# Patient Record
Sex: MALE | Race: OTHER
[De-identification: names, ages, dates, MRNs, and addresses within clinical notes are randomized per-mention and may not be internally consistent; named-entity substitution may affect disease eponyms.]

---

## 2018-05-21 ENCOUNTER — HOSPITAL ENCOUNTER (EMERGENCY)
Dept: HOSPITAL 80 - FED | Age: 33
Discharge: HOME | End: 2018-05-21
Payer: COMMERCIAL

## 2018-05-21 VITALS — DIASTOLIC BLOOD PRESSURE: 87 MMHG | SYSTOLIC BLOOD PRESSURE: 132 MMHG

## 2018-05-21 DIAGNOSIS — S51.812A: Primary | ICD-10-CM
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
## 2018-05-21 DIAGNOSIS — F17.200: ICD-10-CM
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
## 2018-05-21 PROCEDURE — 0HQEXZZ REPAIR LEFT LOWER ARM SKIN, EXTERNAL APPROACH: ICD-10-PCS

## 2018-05-21 PROCEDURE — L3908 WHO COCK-UP NONMOLDE PRE OTS: HCPCS

## 2018-05-21 NOTE — EDPHY
H & P


Stated Complaint: L forearm lac


Time Seen by Provider: 05/21/18 11:45


HPI/ROS: 


HPI:  This is a 32-year-old male who presents with





Chief Complaint:  Left forearm laceration





Location:  Left forearm


Quality:  Laceration


Duration:  Prior to arrival


Signs and Symptoms:  + bleeding, no radiation, no numbness, no weakness, no 

tingling, no incontinence, no decreased range of motion, no swelling, + pain, 

no fever


Timing:  Acute


Severity:  Moderate


Context:  Patient is right-hand dominant presents with shafer with complaints 

of arm laceration.  He is currently working across the street, when he 

accidentally slipped on the scaffolding and fell forward.  He used left arm to 

stop his fall.  He brushed left forearm against the cinder block which caused a 

laceration.  He reports that he was wearing his hard hat and did not hit his 

head. Denies LOC/head injury/neck pain/dizziness/nausea/vomiting/amnesia. 

Reports tetanus up-to-date.  He complains of pain in his left forearm without 

any radiation.  He reports that pain increases with touching the area.  Denies 

decreased range of motion/paresthesias/weakness. 


Modifying Factors:  Direct pressure





Comment: 








ROS: see HPI


Constitutional: No fever, no chills, no weight loss


Eyes:  No blurred vision


Respiratory:  No shortness of breath, no cough


Cardiovascular:  No chest pain


Gastrointestinal:  No nausea, no vomiting no diarrhea 


Genitourinary:  No dysuria 


Extremities:  No myalgias 


Neurologic:  No weakness, no numbness


Skin:  No rashes


Hematologic:  No bruising, no bleeding





MEDICAL/SURGICAL/SOCIAL HISTORY: 


Medical history:  Depression, hyperlipidemia


Surgical history:  Denies


Social history:  Employed. 








CONSTITUTIONAL:  Extremely anxious  male, awake and alert, no obvious 

distress


HEENT: Atraumatic and normocephalic, PERRL, EOMI.  Nares patent; no rhinorrhea;

  no nasal mucosal edema. Tympanic membranes clear. Oropharynx clear, no 

exudate and moist pink mucosa.  Airway patent.  No lymphadenopathy.  No 

meningismus.


Cardiovascular: Normal S1/S2, regular rate, regular rhythm, without murmur rub 

or gallop.


PULMONARY/CHEST:  Symmetrical and nontender. Clear to auscultation bilaterally. 

Good air movement. No accessory muscle usage.


ABDOMEN:  Soft, nondistended, nontender, no rebound, no guarding, no peritoneal 

signs, no masses or organomegaly. No CVAT.


EXTREMITIES:  2/2 pulses, strength 5/5, left posterior mid-forearm shows 6 inch 

deep, v-shaped, laceration; no active bleeding.  Left WRIST:  Extension to 70, 

flexion to 80, radial deviation to 20 degree, ulnar deviation to 30, no 

scaphoid tenderness, no tenderness over ulnar styloid, no tenderness over 

radial styloid.  Full range of motion of flexion extension DIP, PIP, MCP joints 

of all 5 fingers. no clubbing, no cyanosis or edema.


NEUROLOGICAL: no focal neuro deficits.  GCS 15.


SKIN: Warm and dry, no erythema. no rash.  Good capillary refill. 


  





Source: Patient


Exam Limitations: No limitations





- Personal History


Current Tetanus/Diphtheria Vaccine: Yes


Current Tetanus Diphtheria and Acellular Pertussis (TDAP): Yes





- Medical/Surgical History


Hx Asthma: No


Hx Chronic Respiratory Disease: No


Hx Diabetes: No


Hx Cardiac Disease: No


Hx Renal Disease: No


Hx Cirrhosis: No


Hx Alcoholism: No


Hx HIV/AIDS: No


Hx Splenectomy or Spleen Trauma: No


Other PMH: depression, hyperlipidemia,





- Social History


Smoking Status: Current every day smoker


Constitutional: 


 Initial Vital Signs











Temperature (C)  36.8 C   05/21/18 11:31


 


Heart Rate  98   05/21/18 11:31


 


Respiratory Rate  20   05/21/18 11:31


 


Blood Pressure  143/93 H  05/21/18 11:31


 


O2 Sat (%)  92   05/21/18 11:31








 











O2 Delivery Mode               Room Air














Allergies/Adverse Reactions: 


 





No Known Allergies Allergy (Unverified 05/21/18 11:30)


 








Home Medications: 














 Medication  Instructions  Recorded


 


2 Depression Meds  05/21/18


 


Lipitor  05/21/18














Medical Decision Making





- Diagnostics


Imaging Results: 


 Imaging Impressions





Forearm X-Ray  05/21/18 12:00


Impression:


1. No acute osseous abnormality seen left forearm.


2. Laceration adjacent to the distal third of the ulna that appears to extend 

to the bony surface without fracture.











Procedures: 


Procedure:  Laceration repair.





Verbal consent was obtained from the patient.  The 6 inch, deep, complex, v-

shaped laceration on the left mid forearm was anesthetized in the usual fashion 

using 10 mL of 0.5% bupivacaine with epinephrine.  The wound was irrigated, 

draped and explored to its base with a gloved finger.  There were no deep 

structures involved.  No tendon injury was identified.  The wound was repaired 

with continuous running 4-0 Prolene.  The procedure was performed by myself.








Procedure:  Splint placement.





A left Velcro wrist splint was applied by the Emergency Room technician.  After 

application of the splint I returned and re-examined the patient.  The splint 

was adequately immobilizing the joint and distal to the splint the patient's 

circulation and sensation was intact.





ED Course/Re-evaluation: 


Left forearm x-ray my read shows soft tissue injury, no fracture


Tetanus up-to-date. 


Laceration repaired


Given Percocet x1


No signs of neurovascular compromise/tenting of skin/compartment syndrome/

extremities and joints examined above and below area of concern and are 

neurovascularly intact/injury.


Clean sterile dressing and left Velcro wrist splint given


Written and verbal wound care instructions provided. 


Work limited duty note provided. 








This patient was seen under the supervision of my secondary supervising 

physician.  I evaluated care for this patient independently.  





Differential Diagnosis: 


Differential diagnosis includes but is not limited to laceration, nerve injury, 

tendon injury, vascular injury. 








- Data Points


Medications Given: 


 








Discontinued Medications





Oxycodone/Acetaminophen (Percocet 5/325)  1 tab PO EDNOW ONE


   Stop: 05/21/18 12:21


   Last Admin: 05/21/18 13:03 Dose:  1 tab








Departure





- Departure


Disposition: Home, Routine, Self-Care


Clinical Impression: 


Laceration of left forearm without complication


Qualifiers:


 Encounter type: initial encounter Qualified Code(s): S51.812A - Laceration 

without foreign body of left forearm, initial encounter





Condition: Good


Instructions:  Care For Your Stitches (ED), Laceration (ED)


Additional Instructions: 


Keep the dressing dry and in place for 48 hours.


After 48 hours, you may remove the dressing; wash the site daily with mild soap 

and water; then pat dry. 


Take Tylenol 650 mg every 4 hours and/or Ibuprofen 600 mg every 8 hours with 

food as needed for pain. 


Apply ice for 30 minutes at a time; 2-3 times per day for the next 1-2 days.


Wear the splint except to shower until your sutures are removed. 


 


Wound Care Follow-Up:


Removal of sutures in [10-14] days.  Suture removal is complimentary in 

uncomplicated cases.  


Infection or abnormal findings would require reevaluation by the MD.  In that 

case, you may be billed. 








Follow-up with Orthopedics as needed. 








Return to the ER immediately if you experience new or worsening pain, 

discoloration, numbness, tingling, or any other symptoms that concern you.








Referrals: 


[facility],. [Clinic] - As per Instructions


Kevin Jenkins MD [Medical Doctor] - As per Instructions


Stand Alone Forms:  Work Limited Duty